# Patient Record
Sex: FEMALE | NOT HISPANIC OR LATINO | URBAN - METROPOLITAN AREA
[De-identification: names, ages, dates, MRNs, and addresses within clinical notes are randomized per-mention and may not be internally consistent; named-entity substitution may affect disease eponyms.]

---

## 2017-05-15 ENCOUNTER — IMPORTED ENCOUNTER (OUTPATIENT)
Dept: URBAN - METROPOLITAN AREA CLINIC 1 | Facility: CLINIC | Age: 62
End: 2017-05-15

## 2017-05-15 PROBLEM — H52.4: Noted: 2017-05-15

## 2017-05-15 PROBLEM — H52.13: Noted: 2017-05-15

## 2017-05-15 PROCEDURE — S0620 ROUTINE OPHTHALMOLOGICAL EXA: HCPCS

## 2017-05-15 NOTE — PATIENT DISCUSSION
1. Myopia: Recommend computer [id number]. Rx was given for correction if indicated and requested. 2. Presbyopia: Rx was given for corrective spectacles if indicated. 3.  (Combined Cats OU: Observe. )4. (H/o Ocular migraines per pt.)  5. Return for an appointment for a 37 in 1 year with Dr. Emerson Macias.

## 2018-05-15 ENCOUNTER — IMPORTED ENCOUNTER (OUTPATIENT)
Dept: URBAN - METROPOLITAN AREA CLINIC 1 | Facility: CLINIC | Age: 63
End: 2018-05-15

## 2018-05-15 PROBLEM — H52.13: Noted: 2018-05-15

## 2018-05-15 PROCEDURE — S0621 ROUTINE OPHTHALMOLOGICAL EXA: HCPCS

## 2018-05-15 NOTE — PATIENT DISCUSSION
1. Myopia OU- Rx for glasses given. 2.  Combined Cats OU: Observe3. H/o Ocular Migraines  4. 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 After next 1 yr f/u follow Q6 mo. Condition discussed with patient. Return for an appointment in 1 yr 36 with Dr. Eloisa Beltran.

## 2019-05-16 ENCOUNTER — IMPORTED ENCOUNTER (OUTPATIENT)
Dept: URBAN - METROPOLITAN AREA CLINIC 1 | Facility: CLINIC | Age: 64
End: 2019-05-16

## 2019-05-16 PROBLEM — H52.4: Noted: 2019-05-16

## 2019-05-16 PROBLEM — H52.13: Noted: 2019-05-16

## 2019-05-16 PROCEDURE — S0621 ROUTINE OPHTHALMOLOGICAL EXA: HCPCS

## 2019-05-16 NOTE — PATIENT DISCUSSION
1. Myopia: Rx was given for correction if indicated and requested. 2. Presbyopia3. Cataract OU 4. H/o Ocular MigraineReturn for an appointment in 1 year 30/glare with Dr. Kuldip Lorenzana.

## 2022-04-08 ASSESSMENT — VISUAL ACUITY
OS_CC: J4
OS_SC: 20/20
OS_CC: 20/20-1
OS_SC: 20/20-1
OS_SC: 20/20
OD_CC: 20/20-2
OD_SC: 20/20-1
OD_SC: 20/20
OS_CC: J1
OD_CC: J4
OD_SC: 20/20
OD_CC: J1

## 2022-04-08 ASSESSMENT — TONOMETRY
OS_IOP_MMHG: 16
OD_IOP_MMHG: 16
OD_IOP_MMHG: 15
OS_IOP_MMHG: 15
OD_IOP_MMHG: 15
OS_IOP_MMHG: 16

## 2023-10-02 ENCOUNTER — NEW PATIENT (OUTPATIENT)
Dept: URBAN - METROPOLITAN AREA CLINIC 1 | Facility: CLINIC | Age: 68
End: 2023-10-02

## 2023-10-02 DIAGNOSIS — H25.813: ICD-10-CM

## 2023-10-02 DIAGNOSIS — H01.021: ICD-10-CM

## 2023-10-02 DIAGNOSIS — H01.024: ICD-10-CM

## 2023-10-02 PROCEDURE — 99204 OFFICE O/P NEW MOD 45 MIN: CPT

## 2023-10-02 PROCEDURE — 92015 DETERMINE REFRACTIVE STATE: CPT

## 2023-10-02 ASSESSMENT — VISUAL ACUITY
OS_SC: 20/20
OD_SC: 20/25-1
OD_BAT: 20/40
OS_BAT: 20/40
OU_CC: J1+

## 2023-10-02 ASSESSMENT — TONOMETRY
OD_IOP_MMHG: 19
OS_IOP_MMHG: 19